# Patient Record
Sex: MALE | Race: WHITE | NOT HISPANIC OR LATINO | URBAN - METROPOLITAN AREA
[De-identification: names, ages, dates, MRNs, and addresses within clinical notes are randomized per-mention and may not be internally consistent; named-entity substitution may affect disease eponyms.]

---

## 2019-07-27 ENCOUNTER — EMERGENCY (EMERGENCY)
Facility: HOSPITAL | Age: 29
LOS: 1 days | Discharge: HOME | End: 2019-07-27
Attending: EMERGENCY MEDICINE | Admitting: EMERGENCY MEDICINE
Payer: SELF-PAY

## 2019-07-27 VITALS
OXYGEN SATURATION: 98 % | DIASTOLIC BLOOD PRESSURE: 68 MMHG | SYSTOLIC BLOOD PRESSURE: 100 MMHG | HEART RATE: 65 BPM | RESPIRATION RATE: 18 BRPM | TEMPERATURE: 98 F

## 2019-07-27 VITALS
SYSTOLIC BLOOD PRESSURE: 140 MMHG | RESPIRATION RATE: 18 BRPM | WEIGHT: 138.01 LBS | HEIGHT: 72 IN | OXYGEN SATURATION: 98 % | TEMPERATURE: 98 F | DIASTOLIC BLOOD PRESSURE: 77 MMHG | HEART RATE: 62 BPM

## 2019-07-27 DIAGNOSIS — R10.13 EPIGASTRIC PAIN: ICD-10-CM

## 2019-07-27 LAB
ALBUMIN SERPL ELPH-MCNC: 5.4 G/DL — HIGH (ref 3.3–5)
ALP SERPL-CCNC: 37 U/L — LOW (ref 40–120)
ALT FLD-CCNC: 15 U/L — SIGNIFICANT CHANGE UP (ref 10–45)
ANION GAP SERPL CALC-SCNC: 14 MMOL/L — SIGNIFICANT CHANGE UP (ref 5–17)
APPEARANCE UR: CLEAR — SIGNIFICANT CHANGE UP
AST SERPL-CCNC: 19 U/L — SIGNIFICANT CHANGE UP (ref 10–40)
BASOPHILS # BLD AUTO: 0 K/UL — SIGNIFICANT CHANGE UP (ref 0–0.2)
BASOPHILS NFR BLD AUTO: 0 % — SIGNIFICANT CHANGE UP (ref 0–2)
BILIRUB SERPL-MCNC: 1.2 MG/DL — SIGNIFICANT CHANGE UP (ref 0.2–1.2)
BILIRUB UR-MCNC: ABNORMAL
BUN SERPL-MCNC: 12 MG/DL — SIGNIFICANT CHANGE UP (ref 7–23)
CALCIUM SERPL-MCNC: 10.1 MG/DL — SIGNIFICANT CHANGE UP (ref 8.4–10.5)
CHLORIDE SERPL-SCNC: 102 MMOL/L — SIGNIFICANT CHANGE UP (ref 96–108)
CO2 SERPL-SCNC: 25 MMOL/L — SIGNIFICANT CHANGE UP (ref 22–31)
COLOR SPEC: YELLOW — SIGNIFICANT CHANGE UP
CREAT SERPL-MCNC: 0.68 MG/DL — SIGNIFICANT CHANGE UP (ref 0.5–1.3)
DIFF PNL FLD: NEGATIVE — SIGNIFICANT CHANGE UP
EOSINOPHIL # BLD AUTO: 0 K/UL — SIGNIFICANT CHANGE UP (ref 0–0.5)
EOSINOPHIL NFR BLD AUTO: 0 % — SIGNIFICANT CHANGE UP (ref 0–6)
GLUCOSE SERPL-MCNC: 166 MG/DL — HIGH (ref 70–99)
GLUCOSE UR QL: NEGATIVE — SIGNIFICANT CHANGE UP
HCT VFR BLD CALC: 40.5 % — SIGNIFICANT CHANGE UP (ref 39–50)
HGB BLD-MCNC: 13.7 G/DL — SIGNIFICANT CHANGE UP (ref 13–17)
KETONES UR-MCNC: >=80 MG/DL
LEUKOCYTE ESTERASE UR-ACNC: NEGATIVE — SIGNIFICANT CHANGE UP
LIDOCAIN IGE QN: 16 U/L — SIGNIFICANT CHANGE UP (ref 7–60)
LYMPHOCYTES # BLD AUTO: 0.98 K/UL — LOW (ref 1–3.3)
LYMPHOCYTES # BLD AUTO: 4.3 % — LOW (ref 13–44)
MCHC RBC-ENTMCNC: 31.1 PG — SIGNIFICANT CHANGE UP (ref 27–34)
MCHC RBC-ENTMCNC: 33.8 GM/DL — SIGNIFICANT CHANGE UP (ref 32–36)
MCV RBC AUTO: 92 FL — SIGNIFICANT CHANGE UP (ref 80–100)
MONOCYTES # BLD AUTO: 0.59 K/UL — SIGNIFICANT CHANGE UP (ref 0–0.9)
MONOCYTES NFR BLD AUTO: 2.6 % — SIGNIFICANT CHANGE UP (ref 2–14)
NEUTROPHILS # BLD AUTO: 21.27 K/UL — HIGH (ref 1.8–7.4)
NEUTROPHILS NFR BLD AUTO: 89.6 % — HIGH (ref 43–77)
NITRITE UR-MCNC: NEGATIVE — SIGNIFICANT CHANGE UP
PH UR: 6 — SIGNIFICANT CHANGE UP (ref 5–8)
PLATELET # BLD AUTO: 311 K/UL — SIGNIFICANT CHANGE UP (ref 150–400)
POTASSIUM SERPL-MCNC: 4 MMOL/L — SIGNIFICANT CHANGE UP (ref 3.5–5.3)
POTASSIUM SERPL-SCNC: 4 MMOL/L — SIGNIFICANT CHANGE UP (ref 3.5–5.3)
PROT SERPL-MCNC: 8.2 G/DL — SIGNIFICANT CHANGE UP (ref 6–8.3)
PROT UR-MCNC: 100 MG/DL
RBC # BLD: 4.4 M/UL — SIGNIFICANT CHANGE UP (ref 4.2–5.8)
RBC # FLD: 11.7 % — SIGNIFICANT CHANGE UP (ref 10.3–14.5)
SODIUM SERPL-SCNC: 141 MMOL/L — SIGNIFICANT CHANGE UP (ref 135–145)
SP GR SPEC: 1.02 — SIGNIFICANT CHANGE UP (ref 1–1.03)
UROBILINOGEN FLD QL: 1 E.U./DL — SIGNIFICANT CHANGE UP
WBC # BLD: 22.85 K/UL — HIGH (ref 3.8–10.5)
WBC # FLD AUTO: 22.85 K/UL — HIGH (ref 3.8–10.5)

## 2019-07-27 PROCEDURE — 76705 ECHO EXAM OF ABDOMEN: CPT

## 2019-07-27 PROCEDURE — 99285 EMERGENCY DEPT VISIT HI MDM: CPT

## 2019-07-27 PROCEDURE — 36415 COLL VENOUS BLD VENIPUNCTURE: CPT

## 2019-07-27 PROCEDURE — 80053 COMPREHEN METABOLIC PANEL: CPT

## 2019-07-27 PROCEDURE — 74177 CT ABD & PELVIS W/CONTRAST: CPT | Mod: 26

## 2019-07-27 PROCEDURE — 85025 COMPLETE CBC W/AUTO DIFF WBC: CPT

## 2019-07-27 PROCEDURE — 96374 THER/PROPH/DIAG INJ IV PUSH: CPT | Mod: XU

## 2019-07-27 PROCEDURE — 81001 URINALYSIS AUTO W/SCOPE: CPT

## 2019-07-27 PROCEDURE — 83690 ASSAY OF LIPASE: CPT

## 2019-07-27 PROCEDURE — 99284 EMERGENCY DEPT VISIT MOD MDM: CPT | Mod: 25

## 2019-07-27 PROCEDURE — 96375 TX/PRO/DX INJ NEW DRUG ADDON: CPT

## 2019-07-27 PROCEDURE — 74177 CT ABD & PELVIS W/CONTRAST: CPT

## 2019-07-27 PROCEDURE — 96361 HYDRATE IV INFUSION ADD-ON: CPT

## 2019-07-27 PROCEDURE — 76705 ECHO EXAM OF ABDOMEN: CPT | Mod: 26

## 2019-07-27 RX ORDER — METOCLOPRAMIDE HCL 10 MG
5 TABLET ORAL ONCE
Refills: 0 | Status: COMPLETED | OUTPATIENT
Start: 2019-07-27 | End: 2019-07-27

## 2019-07-27 RX ORDER — SODIUM CHLORIDE 9 MG/ML
1000 INJECTION INTRAMUSCULAR; INTRAVENOUS; SUBCUTANEOUS ONCE
Refills: 0 | Status: COMPLETED | OUTPATIENT
Start: 2019-07-27 | End: 2019-07-27

## 2019-07-27 RX ORDER — MORPHINE SULFATE 50 MG/1
4 CAPSULE, EXTENDED RELEASE ORAL ONCE
Refills: 0 | Status: DISCONTINUED | OUTPATIENT
Start: 2019-07-27 | End: 2019-07-27

## 2019-07-27 RX ORDER — ONDANSETRON 8 MG/1
8 TABLET, FILM COATED ORAL ONCE
Refills: 0 | Status: COMPLETED | OUTPATIENT
Start: 2019-07-27 | End: 2019-07-27

## 2019-07-27 RX ORDER — IOHEXOL 300 MG/ML
30 INJECTION, SOLUTION INTRAVENOUS ONCE
Refills: 0 | Status: COMPLETED | OUTPATIENT
Start: 2019-07-27 | End: 2019-07-27

## 2019-07-27 RX ORDER — FAMOTIDINE 10 MG/ML
20 INJECTION INTRAVENOUS ONCE
Refills: 0 | Status: COMPLETED | OUTPATIENT
Start: 2019-07-27 | End: 2019-07-27

## 2019-07-27 RX ORDER — METOCLOPRAMIDE HCL 10 MG
10 TABLET ORAL ONCE
Refills: 0 | Status: DISCONTINUED | OUTPATIENT
Start: 2019-07-27 | End: 2019-07-27

## 2019-07-27 RX ADMIN — SODIUM CHLORIDE 1000 MILLILITER(S): 9 INJECTION INTRAMUSCULAR; INTRAVENOUS; SUBCUTANEOUS at 00:00

## 2019-07-27 RX ADMIN — MORPHINE SULFATE 4 MILLIGRAM(S): 50 CAPSULE, EXTENDED RELEASE ORAL at 17:53

## 2019-07-27 RX ADMIN — ONDANSETRON 8 MILLIGRAM(S): 8 TABLET, FILM COATED ORAL at 16:44

## 2019-07-27 RX ADMIN — SODIUM CHLORIDE 1000 MILLILITER(S): 9 INJECTION INTRAMUSCULAR; INTRAVENOUS; SUBCUTANEOUS at 16:44

## 2019-07-27 RX ADMIN — MORPHINE SULFATE 4 MILLIGRAM(S): 50 CAPSULE, EXTENDED RELEASE ORAL at 17:23

## 2019-07-27 RX ADMIN — FAMOTIDINE 20 MILLIGRAM(S): 10 INJECTION INTRAVENOUS at 16:44

## 2019-07-27 RX ADMIN — Medication 102 MILLIGRAM(S): at 19:15

## 2019-07-27 RX ADMIN — IOHEXOL 30 MILLILITER(S): 300 INJECTION, SOLUTION INTRAVENOUS at 17:07

## 2019-07-27 NOTE — ED ADULT NURSE NOTE - BREATHING, MLM
Spontaneous, unlabored and symmetrical Topical Clindamycin Pregnancy And Lactation Text: This medication is Pregnancy Category B and is considered safe during pregnancy. It is unknown if it is excreted in breast milk.

## 2019-07-27 NOTE — ED PROVIDER NOTE - PROGRESS NOTE DETAILS
Pain-free now, just mild-moderate nausea.  Abdomen soft, nontender to palpation.  awaiting CT results. pt was seen by surgery.  he remains pain-free.  no vomiting in several hours.  drinking water and keeping it down. CT and US negative.  Discussed with surgery, and expect to discharge shortly.  Patient says he feels fine and would like to be discharged.  He lives in [place] and will be going home the day after tomorrow, where he will follow up with his PCP and GI. cleared by surgery, DC home on PPI, follow up with GI at home.  return precautions given.

## 2019-07-27 NOTE — ED PROVIDER NOTE - ATTENDING CONTRIBUTION TO CARE
29M cannabis, chronic nsaid use, otherwise healthy, c/o epigastric abd pain and nbnb emesis.  avss. +epigastric ttp. no acute abd.  leukocytosis w/ mild L shift but no bands. other labs wnl.  ct a/p w/o acute abnl. ruq us w/o acute abnl.  surgery consulted. ntd at this time.  pt reports complete resolution of sx s/p pepcid/zofran. tolerating po. requesting to go home since he's from [place].   will dc home w/ outpatient pmd fu in [place] for GI referral, ppi, strict return precautions. pt understands he should fu w/ pmd given leukocytosis and avoid further nsaid use. questions answered. pt agrees w/ plan.    I saw/evaluated pt in conjunction w/ PA. I agree w/ PA's evaluation/management as noted and as above.
4

## 2019-07-27 NOTE — ED PROVIDER NOTE - CLINICAL SUMMARY MEDICAL DECISION MAKING FREE TEXT BOX
Severe epigastric pain with multiple episodes ? bilious vomiting today.  Tender epigastric region.  Concern for pancreatitis, PUD, cholecystitis, appendicitis.  WBC 24, otherwise unremarkable labs.  CT abd-pelv no acut findings.  Gen surg consulted, evaluated patient and reviewed CT scan - possibly enlarged GB eval by sono.

## 2019-07-27 NOTE — ED PROVIDER NOTE - NS ED ROS FT
CONSTITUTIONAL: No fever, chills, or weakness  NEURO: No headache, no dizziness, no syncope; No focal weakness/tingling/numbness  EYES: No visual changes  ENT: No rhinorrhea or sore throat  PULM: No cough or dyspnea  CV: No chest pain or palpitations  GI: HPI  : No dysuria, hematuria, frequency; no testicular pain or swelling.  No penile DC.  MSK: No neck pain or back pain, no joint pain  SKIN: no rash or unusual bruising

## 2019-07-27 NOTE — ED PROVIDER NOTE - NSFOLLOWUPINSTRUCTIONS_ED_ALL_ED_FT
Avoid NSAIDs, alcohol, spicy/acidic foods.  Minimize marijuana use.  Take omeprazole as prescribed.  Clear diet tomorrow.  Follow up with your PCP in  [place] this week and also see a gastroenterologist.  Return to the Emergency Department if you have any new or worsening symptoms, or if you have any concerns.  _____________________________________________________    EPIGASTRIC PAIN - AfterCare(R) Instructions(ER/ED)     Epigastric Pain    WHAT YOU NEED TO KNOW:    Epigastric pain is felt in the middle of the upper abdomen, between the ribs and the bellybutton. The pain may be mild or severe. Pain may spread from or to another part of your body. Epigastric pain may be a sign of a serious health problem that needs to be treated.     DISCHARGE INSTRUCTIONS:    Call 911 for any of the following:     You have any of the following signs of a heart attack:   Squeezing, pressure, or pain in your chest      You may also have any of the following:   Discomfort or pain in your back, neck, jaw, stomach, or arm      Shortness of breath      Nausea or vomiting      Lightheadedness or a sudden cold sweat      You have severe pain that radiates to your jaw or back.    Return to the emergency department if:     You have severe pain that starts suddenly and quickly gets worse.      You cannot have a bowel movement and are vomiting.      You vomit or cough up blood.      You see blood in your urine or bowel movement.      You feel drowsy and your breathing is slower than usual.    Contact your healthcare provider if:     You have a fever or chills.      You have yellowing of your skin or the whites of your eyes.      You vomit often or several times in a row.       You lose weight without trying.      You have symptoms for longer than 2 weeks.      You have questions or concerns about your condition or care.    Medicines:     Medicines may be given to treat pain or stop vomiting. You may also need medicines to reduce or control stomach acid, or treat an infection.      Take your medicine as directed. Contact your healthcare provider if you think your medicine is not helping or if you have side effects. Tell him of her if you are allergic to any medicine. Keep a list of the medicines, vitamins, and herbs you take. Include the amounts, and when and why you take them. Bring the list or the pill bottles to follow-up visits. Carry your medicine list with you in case of an emergency.    Follow up with your healthcare provider as directed: Write down your questions so you remember to ask them during your visits.     Manage your symptoms:     Keep a record of your symptoms. Include when the pain starts, how long it lasts, and if it is sharp or dull. Also include any foods you ate or activities you did before the pain started. Keep track of anything that helped the pain.       Eat a variety of healthy foods. Healthy foods include fruits, vegetables, whole-grain breads, low-fat dairy products, beans, lean meats, and fish. Ask if you need to be on a special diet. Certain foods may cause your pain, such as alcohol or foods that are high in fat. You may need to eat smaller meals and to eat more often than usual.      Drink liquids as directed. Ask how much liquid to drink each day and which liquids are best for you. Do not have drinks that contain alcohol or caffeine.

## 2019-07-27 NOTE — ED PROVIDER NOTE - NS_EDPROVIDERDISPOUSERTYPE_ED_A_ED
Please send patient results of MRI- should be scanned into the system. If not uploaded yet, please obtain copy from location where he had MRI done.     Dose of cabergoline is 0.25mg twice weekly as noted under medications and in my previous phone note.    Attending Attestation (For Attendings USE Only)...

## 2019-07-27 NOTE — ED PROVIDER NOTE - OBJECTIVE STATEMENT
28 yo m with Hx of headaches for which he takes NSAIDs and chronic orthopedic pain on MJA was in [facility] until 9 am today when he developed a gradual pain in the epigastric region.  Pain does not radiate to back.  It is worse laying supine. No appetite has not eaten today.  Started having vomiting around 11 am, reports numerous episodes of emesis, some with yellow bile.  Vomited at least 10-20 times.  No diarrhea, blood in stool or melena.  Some chills but no documented fever. No chest pain or SOB.  No urinary symptoms.

## 2019-07-27 NOTE — ED ADULT NURSE NOTE - OBJECTIVE STATEMENT
Pt presents complaining of epigastric pain. Pt reports pain began suddenly at 9am, pt reports developing nausea, chills, and diarrhea at noon. PT reports frequent yellow emesis. Pt reports no pmh, no allergies. Pt states last normal b m yesterday.

## 2019-07-27 NOTE — ED PROVIDER NOTE - PHYSICAL EXAMINATION
CONSTITUTIONAL: WD,WN. NAD.    SKIN: Normal color and turgor. No rash.    HEAD: NC/AT.  EYES: Conjunctiva clear. EOMI. PERRL.    ENT: Airway patent, OP without erythema, tonsillar swelling or exudate; uvula midline without swelling. Nasal mucosa clear, no rhinorrhea.   RESPIRATORY:  Breathing non-labored. No retractions or accessory muscle use.  Lungs CTA bilat.  CARDIOVASCULAR:  RRR, S1S2. No M/R/G.      GI:  Very thin abdomen. Abdomen soft, tender in epigastric region.  Neg Muñiz sign.  No RLQ tenderness.  Neg rebound.  : no testicular swelling or tenderness. No CVAT.   MSK: Neck supple with painless ROM.  No lower extremity edema or calf tenderness.  No joint swelling or ROM limitation.  NEURO: Alert and oriented; CN II-XII grossly intact. Speech clear. 5/5 strength in all extremities.  Normal balance and gait.

## 2019-07-27 NOTE — ED ADULT NURSE NOTE - CHIEF COMPLAINT QUOTE
"I started to have pain to my stomach this morning and then nausea and vomiting and I have not been able to hold anything down".

## 2019-07-28 RX ORDER — OMEPRAZOLE 10 MG/1
1 CAPSULE, DELAYED RELEASE ORAL
Qty: 14 | Refills: 0
Start: 2019-07-28 | End: 2019-08-26

## 2019-07-28 NOTE — CONSULT NOTE ADULT - ASSESSMENT
Mr. Yanes is a healthy 28 yo M presenting with abdominal pain likely secondary to gastroenteritis vs gastritis secondary to chronic NSAID use.    - Patient reports significant improvement of pain and nausea in ED. Given clinical status and hemodynamic stability patient does not present with any surgical pathology necessitating intervention.   - Patient educated regarding the dangers of chronic NSAID use. Patient will follow-up with his local PCP for GI referral for possible endoscopy to rule-out gastritis vs gastric/duodenal ulcer. Patient recommended to take Maalox for symptomatic relief and PPI.  - Plan discussed with attending and chief resident.

## 2019-07-28 NOTE — CONSULT NOTE ADULT - SUBJECTIVE AND OBJECTIVE BOX
GENERAL SURGERY CONSULT NOTE    HPI:    Mr. Yanes is a healthy 30 yo M presenting with severe epigastric abdominal pain since 9 AM today. He reports that the pain has been persistent the entire day and reports it to be 10/10 in intensity. He denies any history of a similar pain. Patient reports the pain to be associated with more than 10 episodes of NBNB emesis and severe nausea. His last meal was last night. He reports chills, but denies fevers. His last bowel movement was yesterday; he denies hematochezia or melena. He reports last passing flatus this morning. Patient denies any recent travel history or sick contacts.     Of note patient reports taking daily [id number] mg of ibuprofen daily for the past 2-3 years for migraine headaches.    Patient denies ever needing a colonoscopy or endoscopy.     In the ED, patient is afebrile and hemodynamically stable. WBC is 22.8 +neutrophilia, Albumin 5.4, AK 37, +bilirubin on UA. CT does not show any acute abdominal pathology. RUQ ultrasound negative for any gallbladder pathology.    PMH: hx of prostatitis 3 years ago, resolved with PO antibiotics. Chronic pain 2/2 hx  wrist fracture s/p nonoperative treatment  PSx: denies  Allergies: NKDA  Medications: medical marijuana 2x per day for chronic pain  SH: denies tobacco use, reports social ETOH use, reports marijuana use as above  FH: maternal grandmother diagnosed with breast cancer at age 60s, maternal uncle diagnosed with prostate and throat cancer at age 50s      Vital Signs Last 24 Hrs  T(C): 36.9 (2019 23:46), Max: 36.9 (2019 23:46)  T(F): 98.5 (2019 23:46), Max: 98.5 (2019 23:46)  HR: 65 (2019 23:46) (62 - 83)  BP: 100/68 (2019 23:46) (100/62 - 140/77)  BP(mean): --  RR: 18 (2019 23:46) (18 - 18)  SpO2: 98% (2019 23:46) (98% - 98%)    PHYSICAL EXAM:  General: no acute distress  Cardiovascular: NSR  Pulmonary: nonlabored breathing, no respiratory distress  Abdomen: soft, nondistended, epigastric tenderness, no guarding or rebound. Negative Muñiz's sign.  Extremities: nonedematous    LABS:                        13.7   22.85 )-----------( 311      ( 2019 16:44 )             40.5         141  |  102  |  12  ----------------------------<  166<H>  4.0   |  25  |  0.68    Ca    10.1      2019 16:44    TPro  8.2  /  Alb  5.4<H>  /  TBili  1.2  /  DBili  x   /  AST  19  /  ALT  15  /  AlkPhos  37<L>        Urinalysis Basic - ( 2019 16:23 )    Color: Yellow / Appearance: Clear / S.025 / pH: x  Gluc: x / Ketone: >=80 mg/dL  / Bili: Small / Urobili: 1.0 E.U./dL   Blood: x / Protein: 100 mg/dL / Nitrite: NEGATIVE   Leuk Esterase: NEGATIVE / RBC: x / WBC < 5 /HPF   Sq Epi: x / Non Sq Epi: 0-5 /HPF / Bacteria: Present /HPF